# Patient Record
Sex: MALE | Race: WHITE | Employment: FULL TIME | URBAN - METROPOLITAN AREA
[De-identification: names, ages, dates, MRNs, and addresses within clinical notes are randomized per-mention and may not be internally consistent; named-entity substitution may affect disease eponyms.]

---

## 2018-12-07 ENCOUNTER — TELEPHONE (OUTPATIENT)
Dept: ADMINISTRATIVE | Age: 63
End: 2018-12-07

## 2018-12-07 NOTE — TELEPHONE ENCOUNTER
Dr. Patt Bravo office called to set up new patient appt, pt is scheduled with Dr. Juice Ivy on 1/4/19 at 8:30 a.m and needs new pt paperwork mailed to him.

## 2018-12-11 RX ORDER — CEPHALEXIN 500 MG/1
CAPSULE ORAL
Refills: 0 | COMMUNITY
Start: 2018-12-04 | End: 2019-01-04 | Stop reason: ALTCHOICE

## 2018-12-11 RX ORDER — LORATADINE 10 MG/1
TABLET ORAL
Refills: 0 | COMMUNITY
Start: 2018-12-04 | End: 2020-06-12

## 2019-01-04 ENCOUNTER — OFFICE VISIT (OUTPATIENT)
Dept: CARDIOLOGY CLINIC | Age: 64
End: 2019-01-04
Payer: COMMERCIAL

## 2019-01-04 VITALS
SYSTOLIC BLOOD PRESSURE: 120 MMHG | HEIGHT: 69 IN | WEIGHT: 199 LBS | HEART RATE: 80 BPM | DIASTOLIC BLOOD PRESSURE: 70 MMHG | BODY MASS INDEX: 29.47 KG/M2

## 2019-01-04 DIAGNOSIS — Z91.89 QUIT USING TOBACCO IN REMOTE PAST: ICD-10-CM

## 2019-01-04 DIAGNOSIS — R06.02 SOBOE (SHORTNESS OF BREATH ON EXERTION): Primary | ICD-10-CM

## 2019-01-04 DIAGNOSIS — R79.89 HIGH SERUM LOW-DENSITY LIPOPROTEIN (LDL): ICD-10-CM

## 2019-01-04 DIAGNOSIS — E66.9 NON MORBID OBESITY: ICD-10-CM

## 2019-01-04 PROBLEM — I10 ESSENTIAL HYPERTENSION: Status: ACTIVE | Noted: 2019-01-04

## 2019-01-04 PROCEDURE — 99204 OFFICE O/P NEW MOD 45 MIN: CPT | Performed by: INTERNAL MEDICINE

## 2019-01-04 PROCEDURE — 93000 ELECTROCARDIOGRAM COMPLETE: CPT | Performed by: INTERNAL MEDICINE

## 2019-01-17 ENCOUNTER — TELEPHONE (OUTPATIENT)
Dept: CARDIOLOGY CLINIC | Age: 64
End: 2019-01-17

## 2019-01-17 DIAGNOSIS — R06.02 SOB (SHORTNESS OF BREATH): Primary | ICD-10-CM

## 2019-01-18 ENCOUNTER — HOSPITAL ENCOUNTER (OUTPATIENT)
Dept: NON INVASIVE DIAGNOSTICS | Age: 64
Discharge: HOME | End: 2019-01-18
Payer: COMMERCIAL

## 2019-01-18 DIAGNOSIS — R06.02 SOB (SHORTNESS OF BREATH): ICD-10-CM

## 2019-01-18 LAB
LV EF: 65 %
LVEF MODALITY: NORMAL

## 2019-01-18 PROCEDURE — 93350 STRESS TTE ONLY: CPT

## 2019-06-28 ENCOUNTER — HOSPITAL ENCOUNTER (OUTPATIENT)
Dept: MRI IMAGING | Age: 64
Discharge: HOME | End: 2019-06-30
Payer: COMMERCIAL

## 2019-06-28 DIAGNOSIS — G43.019 MIGRAINE WITHOUT AURA, INTRACTABLE, WITHOUT STATUS MIGRAINOSUS: ICD-10-CM

## 2019-06-28 PROCEDURE — 70551 MRI BRAIN STEM W/O DYE: CPT

## 2019-08-07 ENCOUNTER — OFFICE VISIT (OUTPATIENT)
Dept: PHYSICAL MEDICINE AND REHAB | Age: 64
End: 2019-08-07
Payer: COMMERCIAL

## 2019-08-07 VITALS — HEIGHT: 70 IN | WEIGHT: 184 LBS | BODY MASS INDEX: 26.34 KG/M2

## 2019-08-07 DIAGNOSIS — R20.2 NUMBNESS AND TINGLING IN BOTH HANDS: Primary | ICD-10-CM

## 2019-08-07 DIAGNOSIS — R20.0 NUMBNESS AND TINGLING IN BOTH HANDS: Primary | ICD-10-CM

## 2019-08-07 PROCEDURE — 99202 OFFICE O/P NEW SF 15 MIN: CPT | Performed by: PHYSICAL MEDICINE & REHABILITATION

## 2019-08-07 PROCEDURE — 95911 NRV CNDJ TEST 9-10 STUDIES: CPT | Performed by: PHYSICAL MEDICINE & REHABILITATION

## 2019-08-07 PROCEDURE — 95886 MUSC TEST DONE W/N TEST COMP: CPT | Performed by: PHYSICAL MEDICINE & REHABILITATION

## 2019-08-07 RX ORDER — IBUPROFEN 800 MG/1
TABLET ORAL
Refills: 0 | COMMUNITY
Start: 2019-06-24 | End: 2020-06-12

## 2019-08-07 RX ORDER — TIZANIDINE 4 MG/1
TABLET ORAL
COMMUNITY
Start: 2019-07-12 | End: 2020-06-12

## 2019-08-07 RX ORDER — CLOTRIMAZOLE AND BETAMETHASONE DIPROPIONATE 10; .64 MG/G; MG/G
CREAM TOPICAL
Refills: 0 | COMMUNITY
Start: 2019-07-12 | End: 2020-06-12

## 2019-08-07 RX ORDER — ACETAMINOPHEN AND CODEINE PHOSPHATE 300; 30 MG/1; MG/1
TABLET ORAL PRN
COMMUNITY
Start: 2019-08-05 | End: 2022-04-29

## 2019-08-07 NOTE — PROGRESS NOTES
[address] [facility]  Electrodiagnostic Laboratory  *Accredited by the [address] [place] [address] with exemplary status  [address] [facility] [place]  Phone: [phone]  Fax: [phone]      Date of Examination: 08/07/19  Patient Name: Hubert Maradiaga  is a 66y.o. year old male who was seen due to complaints of   Chief Complaint   Patient presents with    Back Pain     Upper back pain between shoulder blades     Hand Numbness     Patient reports tingling in his hands     that has been present for several months and started after no injury. Physical Exam: MSK: There is no joint effusion, deformity, instability, swelling, erythema or warmth. AROM is full in the spine and extremities. Negative Spurling. Negative carpal compression test and Tinel. Neurologic:  No focal sensorimotor deficit. Reflexes 2+ and symmetric. Gait is normal.    Impression:   1. Numbness and tingling in both hands        Plan:   · EMG is indicated to evaluate the above diagnosis. Orders Placed This Encounter   Procedures    EMG     Standing Status:   Future     Standing Expiration Date:   8/7/2020     Order Specific Question:   Which body part? Answer:   bue    RI NEEDLE EMG EA EXTREMTY W/PARASPINL AREA COMPLETE    RI MOTOR &/SENS 9-10 NRV CNDJ PRECONF ELTRODE LIMB     · EMG was done today and showed bilateral carpal tunnel syndrome. The patient was educated about the diagnosis and the prognosis. Recommend neutral wrist splints at h.s., OT and/or carpal tunnel injection and if no improvement after 4-6 weeks of conservative treatments consider orthopedic surgery evaluation. Recommend repeating the EMG in 1 year if symptoms persist.    · Advised patient to follow up with referring provider. Thank you for allowing me to participate in the care of your patient.       Sincerely,     Liana Quesada

## 2019-08-07 NOTE — PROGRESS NOTES
[address] [facility]  Electrodiagnostic Laboratory  *Accredited by the [address] [place] [address] with exemplary status  [address] [facility] [place]  Phone: [phone]  Fax: [phone]    Referring Provider: Ricardo Marion DO  Primary Care Physician: Lay Coelho DO  Patient Name: Kenya Alvarez  Patient YOB: 1955  Gender: male  BMI: Body mass index is 26.78 kg/m². Height 5' 9.5\" (1.765 m), weight 184 lb (83.5 kg). 8/7/2019    Description of clinical problem:   Chief Complaint   Patient presents with    Back Pain     Upper back pain between shoulder blades     Hand Numbness     Patient reports tingling in his hands      Pain Yes  Pain Score:   7; Numbness/tingling  Intermittent; Weakness  No     Sensory NCS      Nerve / Sites Rec. Site Peak Lat PP Amp Segments Distance Velocity Temp. ms µV  cm m/s °C   R Median - Digit II (Antidromic)      Palm Dig II 2.19 34.4 Palm - Dig II 7 48 34.1      Wrist Dig II 4.01* 27.2 Wrist - Dig II 14 45 34.1   L Median - Digit II (Antidromic)      Palm Dig II 2.29 31.5 Palm - Dig II 7 48 33.6      Wrist Dig II 4.01* 29.9 Wrist - Dig II 14 46 33.6   R Ulnar - Digit V (Antidromic)      Wrist Dig V 3.49 38.6 Wrist - Dig V 14 52 33.6   R Radial - Anatomical snuff box (Forearm)      Forearm Wrist 2.60 17.3 Forearm - Wrist 10 52 33.8       Motor NCS      Nerve / Sites Muscle Onset Amplitude Segments Distance Velocity Temp.     ms mV  cm m/s °C   R Median - APB      Palm APB 2.08 10.3 Palm - APB   34.6      Wrist APB 3.91 6.2 Wrist - Palm 8 44* 34.6      Elbow APB 8.49 6.1 Elbow - Wrist 25 55 34.6   L Median - APB      Palm APB 1.93 10.6 Palm - APB   33.6      Wrist APB 3.49 8.6 Wrist - Palm 8 51 33.6      Elbow APB 7.86 8.2 Elbow - Wrist 24 55 33.6   R Ulnar - ADM      Wrist ADM 3.07 12.0 Wrist - ADM 8  33.8      B. Elbow ADM 6.88 11.3 B. Elbow - Wrist 21 55 33.8      A. Elbow ADM 8.70 10.9 A. Elbow - B. Elbow 10 55 33.8   R Musculocutaneous - Biceps Axilla Biceps 2.76 6.9    34.2   L Musculocutaneous - Biceps      Axilla Biceps 2.92 7.5    34.20       F Wave      Nerve Fmin % F    ms %   R Median - APB 33.65* 20   R Ulnar - ADM 32.23 80   L Median - APB 28.59 80       EMG      EMG Summary Table     Spontaneous MUAP Recruitment   Muscle Nerve Roots IA Fib PSW Fasc Amp Dur. PPP Pattern   L. Biceps brachii Musculocutaneous C5-C6 N None None None N N N N   L. Triceps brachii Radial C6-C8 N None None None N N N N   L. Pronator teres Median C6-C7 N None None None N N N N   L. First dorsal interosseous Ulnar C8-T1 N None None None N N N N   L. Abductor pollicis brevis Median I2-S4 N None None None N N N N   L. Cervical paraspinals (low)  - N None None None N N N N   L. Cervical paraspinals (mid)  - N None None None N N N N   R. Cervical paraspinals (mid)  - N None None None N N N N   R. Cervical paraspinals (low)  - N None None None N N N N   R. Biceps brachii Musculocutaneous C5-C6 N None None None N N N N   R. Triceps brachii Radial C6-C8 N None None None N N N N   R. Pronator teres Median C6-C7 N None None None N N N N   R. First dorsal interosseous Ulnar C8-T1 N None None None N N N N   R. Abductor pollicis brevis Median X0-C1 N None None None N N N N     Study Limitations:  none    Summary of Findings:   Nerve conduction studies:   · The following nerve conduction studies were abnormal:   · The bilateral median sensory latency at the wrist is prolonged. · The right median motor latency at the wrist is prolonged with focal slowing of conduction velocity across the wrist and prolonged minimal F wave. · All other nerve conduction studies listed in the table above were normal in latency, amplitude and conduction velocity. Needle EMG:   · Needle EMG was performed using a concentric needle. All muscles tested, as listed in the table above demonstrated normal amplitude, duration, phases and recruitment and no active denervation signs were seen.      Diagnostic Interpretation: This study was abnormal.     Electrodiagnosis: There is electrodiagnostic evidence of a median mononeuropathy. · Location: bilateral at the wrist.   · Nature: [  ] Axonal   Nayru.Breslow  ] Demyelinating  [  ] Mixed axonal and demyelinating     [ X ] Sensory on left [  ] Motor               [ X ] Mixed sensorimotor on right     [  ] with active denervation       [ X ] without active denervation  · Duration: Acute  · Severity: mild on left, moderate on right  · Prognosis: Good. The prognosis for recovery of demyelinating lesions is good if the cause is alleviated. Previous Study: None      Follow up EMG is recommended if no surgical intervention and symptoms persist in one year. Technologist: Sanam Weaver LPN, CNCT   Physician:    Gerardo Fuchs D.O., P.T. Board Certified Physical Medicine and Rehabilitation  Board Certified Electrodiagnostic Medicine      Nerve conduction studies and electromyography were performed according to our laboratory policies and procedures which can be provided upon request. All abnormal values are identified in the table.  Laboratory normal values can also be provided upon request.       Cc: Jose Means, DO  Keyana Houser, DO

## 2019-08-09 DIAGNOSIS — R20.2 NUMBNESS AND TINGLING IN BOTH HANDS: ICD-10-CM

## 2019-08-09 DIAGNOSIS — R20.0 NUMBNESS AND TINGLING IN BOTH HANDS: ICD-10-CM

## 2020-06-12 ENCOUNTER — OFFICE VISIT (OUTPATIENT)
Dept: CARDIOLOGY CLINIC | Age: 65
End: 2020-06-12
Payer: COMMERCIAL

## 2020-06-12 VITALS
WEIGHT: 197 LBS | BODY MASS INDEX: 29.18 KG/M2 | HEIGHT: 69 IN | SYSTOLIC BLOOD PRESSURE: 130 MMHG | HEART RATE: 80 BPM | DIASTOLIC BLOOD PRESSURE: 72 MMHG

## 2020-06-12 PROCEDURE — 93000 ELECTROCARDIOGRAM COMPLETE: CPT | Performed by: INTERNAL MEDICINE

## 2020-06-12 PROCEDURE — 99213 OFFICE O/P EST LOW 20 MIN: CPT | Performed by: INTERNAL MEDICINE

## 2022-04-29 ENCOUNTER — APPOINTMENT (OUTPATIENT)
Dept: GENERAL RADIOLOGY | Age: 67
End: 2022-04-29
Payer: COMMERCIAL

## 2022-04-29 ENCOUNTER — HOSPITAL ENCOUNTER (EMERGENCY)
Age: 67
Discharge: HOME | End: 2022-04-29
Attending: STUDENT IN AN ORGANIZED HEALTH CARE EDUCATION/TRAINING PROGRAM
Payer: COMMERCIAL

## 2022-04-29 VITALS
SYSTOLIC BLOOD PRESSURE: 130 MMHG | BODY MASS INDEX: 28.06 KG/M2 | HEART RATE: 82 BPM | TEMPERATURE: 98.5 F | DIASTOLIC BLOOD PRESSURE: 63 MMHG | OXYGEN SATURATION: 97 % | WEIGHT: 196 LBS | RESPIRATION RATE: 18 BRPM | HEIGHT: 70 IN

## 2022-04-29 DIAGNOSIS — S22.31XA CLOSED FRACTURE OF ONE RIB OF RIGHT SIDE, INITIAL ENCOUNTER: Primary | ICD-10-CM

## 2022-04-29 PROCEDURE — 6360000002 HC RX W HCPCS: Performed by: STUDENT IN AN ORGANIZED HEALTH CARE EDUCATION/TRAINING PROGRAM

## 2022-04-29 PROCEDURE — 96372 THER/PROPH/DIAG INJ SC/IM: CPT

## 2022-04-29 PROCEDURE — 99284 EMERGENCY DEPT VISIT MOD MDM: CPT

## 2022-04-29 PROCEDURE — 6370000000 HC RX 637 (ALT 250 FOR IP): Performed by: STUDENT IN AN ORGANIZED HEALTH CARE EDUCATION/TRAINING PROGRAM

## 2022-04-29 PROCEDURE — 71101 X-RAY EXAM UNILAT RIBS/CHEST: CPT

## 2022-04-29 RX ORDER — IBUPROFEN 800 MG/1
800 TABLET ORAL 4 TIMES DAILY PRN
Qty: 40 TABLET | Refills: 0 | Status: SHIPPED | OUTPATIENT
Start: 2022-04-29

## 2022-04-29 RX ORDER — HYDROCODONE BITARTRATE AND ACETAMINOPHEN 5; 325 MG/1; MG/1
1 TABLET ORAL EVERY 4 HOURS PRN
Qty: 18 TABLET | Refills: 0 | Status: SHIPPED | OUTPATIENT
Start: 2022-04-29 | End: 2022-05-04

## 2022-04-29 RX ORDER — METHOCARBAMOL 500 MG/1
500 TABLET, FILM COATED ORAL 4 TIMES DAILY PRN
Qty: 30 TABLET | Refills: 0 | Status: SHIPPED | OUTPATIENT
Start: 2022-04-29 | End: 2022-05-09

## 2022-04-29 RX ORDER — LIDOCAINE 4 G/G
1 PATCH TOPICAL DAILY
Status: DISCONTINUED | OUTPATIENT
Start: 2022-04-29 | End: 2022-04-29 | Stop reason: HOSPADM

## 2022-04-29 RX ORDER — ACETAMINOPHEN 500 MG
1000 TABLET ORAL ONCE
Status: COMPLETED | OUTPATIENT
Start: 2022-04-29 | End: 2022-04-29

## 2022-04-29 RX ORDER — KETOROLAC TROMETHAMINE 30 MG/ML
30 INJECTION, SOLUTION INTRAMUSCULAR; INTRAVENOUS ONCE
Status: DISCONTINUED | OUTPATIENT
Start: 2022-04-29 | End: 2022-04-29

## 2022-04-29 RX ORDER — LIDOCAINE 4 G/G
1 PATCH TOPICAL DAILY
Qty: 10 PATCH | Refills: 0 | Status: SHIPPED | OUTPATIENT
Start: 2022-04-29 | End: 2022-05-09

## 2022-04-29 RX ORDER — KETOROLAC TROMETHAMINE 30 MG/ML
30 INJECTION, SOLUTION INTRAMUSCULAR; INTRAVENOUS ONCE
Status: COMPLETED | OUTPATIENT
Start: 2022-04-29 | End: 2022-04-29

## 2022-04-29 RX ADMIN — ACETAMINOPHEN 1000 MG: 500 TABLET ORAL at 10:18

## 2022-04-29 RX ADMIN — KETOROLAC TROMETHAMINE 30 MG: 30 INJECTION, SOLUTION INTRAMUSCULAR at 10:24

## 2022-04-29 ASSESSMENT — PAIN - FUNCTIONAL ASSESSMENT
PAIN_FUNCTIONAL_ASSESSMENT: PREVENTS OR INTERFERES SOME ACTIVE ACTIVITIES AND ADLS
PAIN_FUNCTIONAL_ASSESSMENT: 0-10
PAIN_FUNCTIONAL_ASSESSMENT: PREVENTS OR INTERFERES SOME ACTIVE ACTIVITIES AND ADLS

## 2022-04-29 ASSESSMENT — PAIN SCALES - GENERAL
PAINLEVEL_OUTOF10: 7
PAINLEVEL_OUTOF10: 10
PAINLEVEL_OUTOF10: 10
PAINLEVEL_OUTOF10: 6

## 2022-04-29 ASSESSMENT — PAIN DESCRIPTION - PAIN TYPE
TYPE: ACUTE PAIN
TYPE: ACUTE PAIN

## 2022-04-29 ASSESSMENT — PAIN DESCRIPTION - LOCATION
LOCATION: RIB CAGE
LOCATION: RIB CAGE

## 2022-04-29 ASSESSMENT — PAIN DESCRIPTION - ORIENTATION
ORIENTATION: RIGHT
ORIENTATION: RIGHT

## 2022-04-29 ASSESSMENT — PAIN DESCRIPTION - ONSET
ONSET: ON-GOING
ONSET: SUDDEN

## 2022-04-29 ASSESSMENT — PAIN DESCRIPTION - FREQUENCY
FREQUENCY: CONTINUOUS
FREQUENCY: CONTINUOUS

## 2022-04-29 ASSESSMENT — PAIN DESCRIPTION - DESCRIPTORS
DESCRIPTORS: SHARP
DESCRIPTORS: SHARP

## 2022-04-29 NOTE — ED PROVIDER NOTES
Department of Emergency Medicine   ED  Provider Note  Admit Date/RoomTime: 4/29/2022  9:35 AM  ED Room: 08/08          History of Present Illness:  4/29/22, Time: 10:19 AM EDT  Chief Complaint   Patient presents with    Rib Pain (injury)     Tuesday, fell [place] the golf [place], landed on right side of ribs. Pain very bad today. Migdalia Bianchi is a 61 y.o. male presenting to the ED for Right-sided rib pain. The patient fell 3 days ago which was a mechanical fall though he slipped while he was on the golf course. He struck his right side on the ground. He has had gradually worsening pain since then. He states he got out of the car today and he is pain was worse. Of note, over the past 2 days the patient has been performing yard work and is having difficulty with this as far as pain. Other makes her symptoms better or worse. He is taking Advil for them without significant relief. Denies any shortness of breath. Denies any chest pain. Denies any other injuries including cervical thoracic or lumbar spinal tenderness. Review of Systems:  Review of systems obtained and negative unless stated otherwise above in the HPI.    --------------------------------------------- PAST HISTORY ---------------------------------------------  Past Medical History:  has a past medical history of Hx of cardiovascular stress test and Hyperlipidemia. Past Surgical History:  has no past surgical history on file. Social History:  reports that he quit smoking about 4 years ago. His smoking use included cigarettes. He has a 20.00 pack-year smoking history. He has never used smokeless tobacco. He reports current alcohol use. He reports that he does not use drugs. Family History: family history includes Emphysema in his father. . Unless otherwise noted, family history is non contributory    The patients home medications have been reviewed.     Allergies: Sulfa antibiotics    I have reviewed the past medical history, past surgical history, social history, and family history    ---------------------------------------------------PHYSICAL EXAM--------------------------------------    Constitutional: Appears in no distress  Head: Normocephalic, atraumatic  Eyes: Non-icteric slcera, no conjunctival injection  ENT: Moist mucous membranes,  Neck: Trachea midline, no JVD  Respiratory: Nonlabored respirations. Lungs clear to auscultation bilaterally, no wheezes, rales, or rhonchi. Cardiovascular: Regular rate. Regular rhythm. No murmurs, no gallops, no rubs. Gastrointestinal: Abdomen Soft, Non tender, Non distended. No rebound tenderness, guarding, or rigidity. Extremities: No lower extremity edema  Genitourinary: No CVA tenderness, no suprapubic tenderness  Musculoskeletal: Moves all extremities, no deformity, exquisite tenderness to palpation along the right anterior lateral chest wall  Skin: Pink, warm, dry without rash. Neurologic: Alert, symmetric facies, no aphasia    -------------------------------------------------- RESULTS -------------------------------------------------  I have personally reviewed all laboratory and imaging results for this patient. Results are listed below.      LABS: (Lab results interpreted by me)  No results found for this visit on 04/29/22.,       RADIOLOGY:  Interpreted by Radiologist unless otherwise specified  XR RIBS RIGHT INCLUDE CHEST (MIN 3 VIEWS)   Final Result   Nondisplaced rib fracture on the right involving the 7th rib and questionably   the 8th rib.               ------------------------- NURSING NOTES AND VITALS REVIEWED ---------------------------   The nursing notes within the ED encounter and vital signs as below have been reviewed by myself  BP (!) 145/91   Pulse 73   Temp 97.3 °F (36.3 °C) (Temporal)   Resp 16   Ht 5' 9.5\" (1.765 m)   Wt 196 lb (88.9 kg)   SpO2 100%   BMI 28.53 kg/m²     Oxygen Saturation Interpretation: Normal    The patients available past medical records and past encounters were reviewed. ------------------------------ ED COURSE/MEDICAL DECISION MAKING----------------------  Medications   lidocaine 4 % external patch 1 patch (1 patch TransDERmal Patch Applied 4/29/22 1022)   acetaminophen (TYLENOL) tablet 1,000 mg (1,000 mg Oral Given 4/29/22 1018)   ketorolac (TORADOL) injection 30 mg (30 mg IntraMUSCular Given 4/29/22 1024)        Re-Evaluations: This patient's ED course included:a personal history and physicial examination and re-evaluation prior to disposition    This patient has remained hemodynamically stable during their ED course. Consultations:  None    Medical Decision Making:   Patient presents emergency department with rib fractures of ribs 7 and possibly the 8 on imaging. No evidence of pneumothorax. The patient has been taught how to use incentive spirometer. Will be given Toradol in the emergency department as well as Tylenol. Lidocaine patches applied to the area as well. Patient will not require further inpatient evaluation management. He will be discharged with incentive spirometer, compressive Ace wrap, lidocaine patches, Robaxin for spasm as well as Norco for breakthrough pain. He is also given high-dose ibuprofen. He will be prescribed lidocaine patches as well. He is educated on expectant management and Follow-up as an outpatient. Counseling: The emergency provider has spoken with the patient and discussed todays results, in addition to providing specific details for the plan of care and counseling regarding the diagnosis and prognosis. Questions are answered at this time and they are agreeable with the plan.       --------------------------------- IMPRESSION AND DISPOSITION ---------------------------------    IMPRESSION  1.  Closed fracture of one rib of right side, initial encounter        DISPOSITION  Disposition: Discharge to home  Patient condition is stable    I, Dr. Ngoc Rousseau, am the primary provider of record    Therman Osgood, DO  Emergency Medicine    NOTE: This report was transcribed using voice recognition software.  Every effort was made to ensure accuracy; however, inadvertent computerized transcription errors may be present         Lady Varela DO  04/29/22 1103

## 2022-04-29 NOTE — ED NOTES
Pt given discharge summary with education on rib fracture and incentive spirometer, pt also given prescription and aware to  prescriptions at assigned pharmacy.   Pt also informed to follow up with his primary care physician     Elba Whaley RN  04/29/22 3011

## 2025-08-11 LAB — TESTOST SERPL-MCNC: 375 NG/DL (ref 193–740)

## 2025-09-03 ENCOUNTER — HOSPITAL ENCOUNTER (OUTPATIENT)
Dept: GENERAL RADIOLOGY | Age: 70
Discharge: HOME | End: 2025-09-05
Payer: MEDICARE

## 2025-09-03 VITALS — WEIGHT: 209 LBS | BODY MASS INDEX: 30.96 KG/M2 | HEIGHT: 69 IN

## 2025-09-03 DIAGNOSIS — N64.4 MASTODYNIA: ICD-10-CM

## 2025-09-03 PROCEDURE — G0279 TOMOSYNTHESIS, MAMMO: HCPCS
